# Patient Record
Sex: MALE | Race: WHITE | NOT HISPANIC OR LATINO | Employment: OTHER | URBAN - NONMETROPOLITAN AREA
[De-identification: names, ages, dates, MRNs, and addresses within clinical notes are randomized per-mention and may not be internally consistent; named-entity substitution may affect disease eponyms.]

---

## 2017-01-05 ENCOUNTER — TELEPHONE (OUTPATIENT)
Dept: ORTHOPEDIC SURGERY | Facility: CLINIC | Age: 37
End: 2017-01-05

## 2017-01-05 NOTE — TELEPHONE ENCOUNTER
Darius Robinson calling for the patient, asking for a refill on pain medication, Hydrocodone.  Please advise [phone].

## 2017-01-11 NOTE — TELEPHONE ENCOUNTER
Script written and printed. Please have patient come in and pick it up  Tab Norco 5/325 mg po q 6-8 prn pain # 40 n0 refills  SM

## 2017-01-14 NOTE — TELEPHONE ENCOUNTER
Script written and printed. Please have patient come in and pick it up Tab Norco 5/325 mg po q 8 hours prn pain # 40  No refills SM

## 2017-01-16 ENCOUNTER — TELEPHONE (OUTPATIENT)
Dept: ORTHOPEDIC SURGERY | Facility: CLINIC | Age: 37
End: 2017-01-16

## 2017-01-16 RX ORDER — HYDROCODONE BITARTRATE AND ACETAMINOPHEN 5; 325 MG/1; MG/1
1 TABLET ORAL EVERY 8 HOURS PRN
Qty: 40 TABLET | Refills: 0 | Status: SHIPPED | OUTPATIENT
Start: 2017-01-16

## 2017-01-26 ENCOUNTER — OFFICE VISIT (OUTPATIENT)
Dept: ORTHOPEDIC SURGERY | Facility: CLINIC | Age: 37
End: 2017-01-26

## 2017-01-26 DIAGNOSIS — S42.295D OTHER CLOSED NONDISPLACED FRACTURE OF PROXIMAL END OF LEFT HUMERUS WITH ROUTINE HEALING, SUBSEQUENT ENCOUNTER: ICD-10-CM

## 2017-01-26 DIAGNOSIS — S43.005D SHOULDER DISLOCATION, LEFT, SUBSEQUENT ENCOUNTER: Primary | ICD-10-CM

## 2017-01-26 PROCEDURE — 73030 X-RAY EXAM OF SHOULDER: CPT | Performed by: ORTHOPAEDIC SURGERY

## 2017-01-26 PROCEDURE — 99213 OFFICE O/P EST LOW 20 MIN: CPT | Performed by: ORTHOPAEDIC SURGERY

## 2017-01-26 RX ORDER — HYDROCODONE BITARTRATE AND ACETAMINOPHEN 5; 325 MG/1; MG/1
1 TABLET ORAL EVERY 12 HOURS PRN
Qty: 40 TABLET | Refills: 0 | Status: SHIPPED | OUTPATIENT
Start: 2017-01-26

## 2017-02-09 ENCOUNTER — TELEPHONE (OUTPATIENT)
Dept: ORTHOPEDIC SURGERY | Facility: CLINIC | Age: 37
End: 2017-02-09

## 2017-02-09 NOTE — TELEPHONE ENCOUNTER
Called and left message on Prosper's voicemail telling him the per VICTORIANO he cannot refill Hydrocodone but he is willing to call in Ultram. If he would like this called in I told him to call back and let me know/grant.

## 2017-03-09 ENCOUNTER — OFFICE VISIT (OUTPATIENT)
Dept: ORTHOPEDIC SURGERY | Facility: CLINIC | Age: 37
End: 2017-03-09

## 2017-03-09 DIAGNOSIS — S43.005D SHOULDER DISLOCATION, LEFT, SUBSEQUENT ENCOUNTER: Primary | ICD-10-CM

## 2017-03-09 DIAGNOSIS — S42.295D OTHER CLOSED NONDISPLACED FRACTURE OF PROXIMAL END OF LEFT HUMERUS WITH ROUTINE HEALING, SUBSEQUENT ENCOUNTER: ICD-10-CM

## 2017-03-09 PROCEDURE — 99213 OFFICE O/P EST LOW 20 MIN: CPT | Performed by: ORTHOPAEDIC SURGERY

## 2017-03-09 NOTE — PROGRESS NOTES
Chief Complaint   Patient presents with   • Left Shoulder - Follow-up           HPI the patient is here today for a follow up of his left shoulder.  He is doing a whole lot better and transverse improving range of motion although he has not yet fully recover his full range of motion.  His pain is gone down considerably.  He does not have any swelling or bruising at this point.  She is using his upper extremity for day-to-day function at a higher rate and overall seems to be making good progress with conservative nonoperative care.  He does understand the risks of redislocation and also understand that he might require surgical stabilization of the shoulder if he has another episode of dislocation.        There were no vitals filed for this visit.        Review of Systems   All other systems reviewed and are negative.          Physical Exam   Constitutional: He is oriented to person, place, and time. He appears well-developed and well-nourished.   HENT:   Head: Normocephalic and atraumatic.   Eyes: EOM are normal.   Neck: Normal range of motion. Neck supple.   Cardiovascular: Normal rate and intact distal pulses.    Pulmonary/Chest: Effort normal and breath sounds normal.   Abdominal: Soft. Bowel sounds are normal.   Musculoskeletal: Normal range of motion. He exhibits edema. He exhibits no deformity.   Neurological: He is alert and oriented to person, place, and time.   Skin: Skin is warm and dry.   Psychiatric: He has a normal mood and affect. His behavior is normal. Judgment and thought content normal.   Nursing note and vitals reviewed.          Joint/Body Part Specific Exam:  left shoulder. The glenohumeral joint is extremely tender over the anterior aspect of the articulation. Axillary nerve function is well preserved. Radial artery pulses are palpable. Forward flexion is associated with a click and a distinct pop. Neer sign is positive. EER sign is positive. Sun sign is positive. Skin and soft tissues are  slightly swollen. AC joint is mildly tender. The pain can be reproduced with external rotation, abduction and axial loading of the joint. There is no evidence of multidirectional instability. The pain level is 4.  Forward flexion is 0-130°.  Active abduction 0-120°.  External rotation against resistance bothers the patient to some degree.  Apprehension sign is positive.      X-RAY Report:        Diagnostics:        Prosper was seen today for follow-up.    Diagnoses and all orders for this visit:    Shoulder dislocation, left, subsequent encounter    Other closed nondisplaced fracture of proximal end of left humerus with routine healing, subsequent encounter          Procedures        Plan:  Redislocation precautions.    Avoid the provocative position.      Continue with physical therapy to improve the range of motion with stretching and strengthening of the rotator cuff musculature.      Tablet ibuprofen 600 mg tab 1 by mouth twice a day when necessary pain and discomfort.    Follow-up in my office in 3 months.    Nonoperative care at this point however if he does sustained another dislocation then we will go ahead and get an MRI and make sure he does not have a labrum tear or a Bankart lesion that might require surgical intervention.

## 2017-03-28 ENCOUNTER — TELEPHONE (OUTPATIENT)
Dept: ORTHOPEDIC SURGERY | Facility: CLINIC | Age: 37
End: 2017-03-28

## 2017-03-28 NOTE — TELEPHONE ENCOUNTER
Noted.  Patient needs to be very conscientious and regular about his physical therapy otherwise he is likely to have a less than optimal outcome with his shoulder.  Please let him know that with either a phone call or a ladder drop into his male.    Thank you SM

## 2020-03-10 ENCOUNTER — OFFICE VISIT (OUTPATIENT)
Dept: ORTHOPEDIC SURGERY | Facility: CLINIC | Age: 40
End: 2020-03-10

## 2020-03-10 VITALS — WEIGHT: 212.8 LBS | TEMPERATURE: 98.4 F | HEIGHT: 69 IN | BODY MASS INDEX: 31.52 KG/M2

## 2020-03-10 DIAGNOSIS — S62.525A CLOSED NONDISPLACED FRACTURE OF DISTAL PHALANX OF LEFT THUMB, INITIAL ENCOUNTER: Primary | ICD-10-CM

## 2020-03-10 PROCEDURE — 73140 X-RAY EXAM OF FINGER(S): CPT | Performed by: PHYSICIAN ASSISTANT

## 2020-03-10 PROCEDURE — 99204 OFFICE O/P NEW MOD 45 MIN: CPT | Performed by: PHYSICIAN ASSISTANT

## 2020-03-10 RX ORDER — NAPROXEN 500 MG/1
TABLET ORAL
COMMUNITY
Start: 2020-03-07

## 2020-03-11 ENCOUNTER — TELEPHONE (OUTPATIENT)
Dept: ORTHOPEDIC SURGERY | Facility: CLINIC | Age: 40
End: 2020-03-11

## 2020-03-11 RX ORDER — HYDROCODONE BITARTRATE AND ACETAMINOPHEN 5; 325 MG/1; MG/1
1 TABLET ORAL EVERY 6 HOURS PRN
Qty: 20 TABLET | Refills: 0 | Status: SHIPPED | OUTPATIENT
Start: 2020-03-11

## 2020-03-11 NOTE — TELEPHONE ENCOUNTER
PT IS ASKING WHY THE PAIN MEDICINE HAS NOT BEEN SENT IN YET.     ALSO THE BRACE THAT WAS PLACED BY TIFFANY FROM [facility] CAME OFF IN HIS SLEEP.     I TOLD THE PATIENT TO CALL [facility] BUT ALSO TOLD PATIENT I WOULD LET YOU KNOW AS  WELL.

## 2020-03-11 NOTE — PROGRESS NOTES
NEW VISIT    Patient: Prosper Caceres  ?  YOB: 1980    MRN: [id number]  ?  Chief Complaint   Patient presents with   • Left Thumb - Pain   • Establish Care      ?  HPI:   39 y.o. male presents with complaint of left thumb pain following an injury on 3/1/20. He states he was trying to change a spark plug and ended up bending the finger backwards. After the pain did not improve he went to urgent care for xrays.    Pain Location: left 1st finger-distal end  Radiation: none  Quality: aching  Intensity/Severity: moderate  Duration: since 3/1/20  Onset quality: sudden  Timing: constant  Aggravating Factors: movement of the finger, palpating the finger  Alleviating Factors: none -nsaids slightly improve pain  Previous Episodes: no  Associated Symptoms: pain, swelling, decreased ROM  ADLs Affected: grooming/hygiene/toileting/personal care, work related activities, recreational activities/sports  Previous Treatment: Seen at [facility] where xrays were obtained    This patient is a new patient.  This problem is new to this examiner.      Allergies: No Known Allergies    Medications:   Home Medications:  Current Outpatient Medications on File Prior to Visit   Medication Sig   • naproxen (NAPROSYN) 500 MG tablet TK 1 T PO Q 12 H   • HYDROcodone-acetaminophen (NORCO)  MG per tablet Take 1 tablet by mouth Every 6 (Six) Hours As Needed for moderate pain (4-6).   • HYDROcodone-acetaminophen (NORCO) 5-325 MG per tablet Take 1 tablet by mouth Every 12 (Twelve) Hours As Needed for severe pain (7-10). 1 pill oral Q12 H PRN severe pain   • HYDROcodone-acetaminophen (NORCO) 5-325 MG per tablet Take 1 tablet by mouth Every 8 (Eight) Hours As Needed (severe pain).   • HYDROcodone-acetaminophen (NORCO) 5-325 MG per tablet Take 1 tablet by mouth Every 12 (Twelve) Hours As Needed (severe pain).     No current facility-administered medications on file prior to visit.      Current Medications:  Scheduled Meds:  PRN Meds:.    I  "have reviewed the patient's medical history in detail and updated the computerized patient record.  Review and summarization of old records include:    Past Medical History:   Diagnosis Date   • Dislocation, shoulder      Past Surgical History:   Procedure Laterality Date   • SHOULDER SURGERY     • WRIST SURGERY       Social History     Occupational History   • Not on file   Tobacco Use   • Smoking status: Current Every Day Smoker   Substance and Sexual Activity   • Alcohol use: No   • Drug use: No   • Sexual activity: Not on file     Family History   Problem Relation Age of Onset   • Diabetes Mother          Review of Systems   Constitutional: Negative.    Eyes: Negative.    Respiratory: Negative.    Cardiovascular: Negative.    Endocrine: Negative.    Musculoskeletal: Positive for arthralgias and joint swelling.   Skin: Negative.    Allergic/Immunologic: Negative.    Neurological: Negative.    Hematological: Negative.    Psychiatric/Behavioral: Negative.           Wt Readings from Last 3 Encounters:   03/10/20 96.5 kg (212 lb 12.8 oz)     Ht Readings from Last 3 Encounters:   03/10/20 175.3 cm (69\")     Body mass index is 31.43 kg/m².  Facility age limit for growth percentiles is 20 years.  Vitals:    03/10/20 1330   Temp: 98.4 °F (36.9 °C)         Physical Exam  Constitutional: Patient is oriented to person, place, and time. Appears well-developed and well-nourished.   HENT:   Head: Normocephalic and atraumatic.   Eyes: Conjunctivae and EOM are normal. Pupils are equal, round, and reactive to light.   Cardiovascular: Normal rate and intact distal pulses.   Pulmonary/Chest: Effort normal and breath sounds normal.   Musculoskeletal:   See detailed exam below   Neurological: Alert and oriented to person, place, and time. No sensory deficit. Coordination normal.   Skin: Skin is warm and dry. Capillary refill takes less than 2 seconds. No rash noted. No erythema.   Psychiatric: Patient has a normal mood and affect. " His behavior is normal. Judgment and thought content normal.   Nursing note and vitals reviewed.        Ortho Exam:   Left 1st finger-1st phalanx fracture: He is right dominant.   There is a significant amount of soft tissue swelling both on the dorsal and volar aspects of the distal end of the first phalanx. There is significant tenderness with palpation of the base of the distal phalanx Capillary refill is 2 seconds with a brisk return. Cascade of the fingers is  well preserved. Testing of collateral ligaments appears to be within normal limits although associated with discomfort. There is no evidence of a compartmental syndrome.       Diagnostics:  XR - 1 Result   I have personally reviewed   Results for orders placed in visit on 03/10/20   XR FINGER 2+ VW LEFT 3/10/2020    and my interpretation of the image is as follows:   Findings: nondisplaced fracture at the medial aspect at the base of the distal phalanx of left thumb  Bony lesion: no  Soft tissues: increased  Joint spaces: within normal limits  Hardware appropriately positioned: not applicable  Prior studies available for comparison: no        Assessment:  Prosper was seen today for establish care and pain.    Diagnoses and all orders for this visit:    Closed nondisplaced fracture of distal phalanx of left thumb, initial encounter  -     Ambulatory Referral to Occupational Therapy    Chronic pain of left thumb  -     XR Finger 2+ View Left      ?    Plan    Orders Placed This Encounter   Procedures   • XR Finger 2+ View Left   • Ambulatory Referral to Occupational Therapy      · Closed treatment of fracture and or dislocation.  • Discussion of orthopaedic goals and activities and patient and/or guardian expressed appreciation.  • Ice, heat, and/or modalities as beneficial  • Elevate hand for residual swelling  • Reduced physical activity as appropriate and avoid offending activity  • Weight bearing parameters reviewed  • Reinjury Precautions  • Take  prescribed medications as instructed only as tolerated  • Patient is encouraged to call or return for any issues or concerns.  • Sent to [facility] for custom splint  · Follow up in 4 weeks    Date of encounter: 03/10/2020   Darius Sanchez PA-C      Electronically signed by Darius Sanchez PA-C, 03/10/20, 8:48 PM.

## 2020-03-12 NOTE — TELEPHONE ENCOUNTER
PATIENT'S GIRLFRIEND, KEVON, CALLED CHECKING STATUS OF PAIN MEDICATION FOR PATIENT.  I EXPLAINED THAT THE PRESCRIPTION REQUEST HAS BEEN SENT TO DR. KIM, HOWEVER, IT HAS NOT YET BEEN SIGNED OFF ON.  SHE ASKED WHAT PATIENT SHOULD DO IN THE MEAN TIME FOR HIS PAIN.  SHE STATED THAT THE URGENT CARE FACILITY GAVE HIM NAPROXEN, BUT HE IS OUT OF THAT PRESCRIPTION AND IT DIDN'T REALLY HELP WITH THE PAIN.  HE HAS BEEN TAKING TYLENOL, BUT THAT IS NOT HELPING EITHER.      PLEASE ADVISE

## 2020-03-12 NOTE — TELEPHONE ENCOUNTER
You are correct about calling [facility].  I have sent Dr. Ryan a prescription request as well as discussed it with him but it does not appear he is signed it at this point.

## 2020-03-13 NOTE — TELEPHONE ENCOUNTER
I sent it in yesterday  Please check on it at pharmacy   If its not made it through please re send it to me

## 2020-03-19 DIAGNOSIS — S62.525A CLOSED NONDISPLACED FRACTURE OF DISTAL PHALANX OF LEFT THUMB, INITIAL ENCOUNTER: Primary | ICD-10-CM

## 2020-03-19 RX ORDER — HYDROCODONE BITARTRATE AND ACETAMINOPHEN 5; 325 MG/1; MG/1
1 TABLET ORAL EVERY 12 HOURS PRN
Qty: 30 TABLET | Refills: 0 | Status: SHIPPED | OUTPATIENT
Start: 2020-03-19

## 2020-03-19 RX ORDER — HYDROCODONE BITARTRATE AND ACETAMINOPHEN 5; 325 MG/1; MG/1
1 TABLET ORAL EVERY 12 HOURS PRN
Qty: 30 TABLET | Refills: 0 | Status: SHIPPED | OUTPATIENT
Start: 2020-03-19 | End: 2020-03-19 | Stop reason: SDUPTHER

## 2020-03-19 NOTE — TELEPHONE ENCOUNTER
PATIENT'S GIRLFRIEND CALLED AND IS REQUESTING A REFILL ON PATIENT'S PAIN MEDICATION (HYDROCODONE 5/325).  PATIENT IS S/P LEFT THUMB FRACTURE FIRST SEEN ON 3/10/20 AND USES [facility] IN [place]./TREMAYNEF